# Patient Record
Sex: MALE | Race: OTHER | NOT HISPANIC OR LATINO | URBAN - METROPOLITAN AREA
[De-identification: names, ages, dates, MRNs, and addresses within clinical notes are randomized per-mention and may not be internally consistent; named-entity substitution may affect disease eponyms.]

---

## 2024-09-15 ENCOUNTER — EMERGENCY (EMERGENCY)
Facility: HOSPITAL | Age: 19
LOS: 0 days | Discharge: HOME | End: 2024-09-15
Attending: EMERGENCY MEDICINE
Payer: MEDICAID

## 2024-09-15 VITALS
RESPIRATION RATE: 18 BRPM | WEIGHT: 255.07 LBS | SYSTOLIC BLOOD PRESSURE: 158 MMHG | HEIGHT: 68 IN | OXYGEN SATURATION: 99 % | DIASTOLIC BLOOD PRESSURE: 88 MMHG | HEART RATE: 98 BPM | TEMPERATURE: 98 F

## 2024-09-15 DIAGNOSIS — R07.89 OTHER CHEST PAIN: ICD-10-CM

## 2024-09-15 DIAGNOSIS — R42 DIZZINESS AND GIDDINESS: ICD-10-CM

## 2024-09-15 DIAGNOSIS — I10 ESSENTIAL (PRIMARY) HYPERTENSION: ICD-10-CM

## 2024-09-15 PROCEDURE — 93308 TTE F-UP OR LMTD: CPT | Mod: 26

## 2024-09-15 PROCEDURE — 99284 EMERGENCY DEPT VISIT MOD MDM: CPT | Mod: 25

## 2024-09-15 PROCEDURE — 93010 ELECTROCARDIOGRAM REPORT: CPT

## 2024-09-15 PROCEDURE — 93308 TTE F-UP OR LMTD: CPT

## 2024-09-15 PROCEDURE — 93005 ELECTROCARDIOGRAM TRACING: CPT

## 2024-09-15 PROCEDURE — 99285 EMERGENCY DEPT VISIT HI MDM: CPT

## 2024-09-15 PROCEDURE — 71046 X-RAY EXAM CHEST 2 VIEWS: CPT

## 2024-09-15 PROCEDURE — 71046 X-RAY EXAM CHEST 2 VIEWS: CPT | Mod: 26

## 2024-09-15 RX ORDER — ONDANSETRON 2 MG/ML
4 INJECTION, SOLUTION INTRAMUSCULAR; INTRAVENOUS ONCE
Refills: 0 | Status: COMPLETED | OUTPATIENT
Start: 2024-09-15 | End: 2024-09-15

## 2024-09-15 RX ADMIN — ONDANSETRON 4 MILLIGRAM(S): 2 INJECTION, SOLUTION INTRAMUSCULAR; INTRAVENOUS at 18:40

## 2024-09-15 NOTE — ED PROVIDER NOTE - PATIENT PORTAL LINK FT
You can access the FollowMyHealth Patient Portal offered by [facility] by registering at the following website: [email or web address]. By joining [employer]’s FollowMyHealth portal, you will also be able to view your health information using other applications (apps) compatible with our system.

## 2024-09-15 NOTE — ED PROVIDER NOTE - CLINICAL SUMMARY MEDICAL DECISION MAKING FREE TEXT BOX
19-year-old male history of hypertension here for evaluation of left anterior throbbing chest pain since yesterday no PE risk factors fever chills viral-like symptoms.  Pain is not worse with movement.  CON: ao x 3, HENMT: neck supple,  CV: s1s2 ctab, RESP: cta b/l, GI:  soft, nontender, no rebound, no guarding, SKIN: no rash, MSK: no deformities, NEURO: no gross motor or sensory deficit Psychiatric: appropriate mood, appropriate affect   impression  Patient with low risk chest pain EKG normal sinus rhythm with sinus arrhythmia and lateral inferior ST T wave changes which are similar to previous.  Chest x-ray within normal notes POCUS echo normal outpatient follow-up advised.

## 2024-09-15 NOTE — ED PROVIDER NOTE - PROGRESS NOTE DETAILS
Damien Joseph, DO: ecg, cxr, and pocus echo reassuring. pt has cardiologist, advised to f/u. Patient is well appearing, NAD, afebrile, hemodynamically stable. Any available tests and studies were discussed with patient and/or family. Discharged with instructions in further symptomatic care, return precautions, and need for PMD f/u.

## 2024-09-15 NOTE — ED PROVIDER NOTE - EKG/XRAY ADDITIONAL INFORMATION
EKG normal sinus rhythm with sinus arrhythmia and lateral inferior ST T wave changes which are similar to previous.  Chest x-ray within normal notes POCUS echo normal outpatient follow-up advised.

## 2024-09-15 NOTE — ED ADULT NURSE NOTE - OBJECTIVE STATEMENT
C/o chest pain and dizziness since yesterday. [place] [facility] [place] ASA 324mg given by EMS.

## 2024-09-15 NOTE — ED PROVIDER NOTE - OBJECTIVE STATEMENT
19-year-old past medical history of hypertension, who presents for throbbing anterior left chest pain intermittent and waxing waning since yesterday.  Notably around 1 PM today, patient was sitting about to go to the gym when he started having worsening chest pain with some lightheadedness.  He went to urgent care and referred here for further evaluation.  Denies any recent illnesses, fevers, chills, shortness of breath, abdominal pain, nausea/vomiting, diaphoresis, radiation of pain, leg swelling.  Denies any known family history of early cardiac disease or sudden death of unclear etiology.  Denies any tobacco, alcohol, recreational drug use, stimulant use, testosterone use, recent travels or extended periods of immobilization.

## 2024-09-15 NOTE — ED PROVIDER NOTE - PHYSICAL EXAMINATION
Initial vital signs reviewed.  General: NAD, nontoxic appearing.  HENT: AT/NC.  Eyes: non-injected conjunctivae b/l.  Neck: supple.  CV: RRR, no murmurs. 2+ distal pulses x4.  Pulm: nonlabored work of breathing, CTAB, no chest wall tenderness or crepitus.  Abd: soft, nondistended, nontender.  MSK: no joint deformity.  Skin: warm, dry, well-perfused.  Neuro: A&Ox4.  Psych: appropriate mood and affect.

## 2024-09-15 NOTE — ED ADULT TRIAGE NOTE - CHIEF COMPLAINT QUOTE
C/o chest pain and dizziness since yesterday C/o chest pain and dizziness since yesterday. Sent in from [facility] [place] for abnormal [place]KG. ASA 324mg given by EMS.

## 2024-09-15 NOTE — ED PROVIDER NOTE - NSFOLLOWUPINSTRUCTIONS_ED_ALL_ED_FT
Please follow up with your cardiologist.    Chest Pain    You were seen and evaluated for chest pain. After a work up in the Emergency Department, it was determined that it is safe for you to be discharged with close follow up. Please monitor your symptoms and bring all of your results to follow up with your doctor. Please call for follow up with the heart doctor. As discussed, you may require further work up and testing for your chest pain.    WHAT YOU NEED TO KNOW:  Chest pain can be caused by a range of conditions, from not serious to life-threatening. Chest pain can be a symptom of a digestive problem, such as acid reflux or a stomach ulcer. An anxiety attack or a strong emotion, such as anger, can also cause chest pain. Infection, inflammation, or a fracture in the bones or cartilage in your chest can cause pain or discomfort. Sometimes chest pain or pressure is caused by poor blood flow to your heart (angina). Chest pain may also be caused by life-threatening conditions such as a heart attack or blood clot in your lungs.      DISCHARGE INSTRUCTIONS:  Call your local emergency number (911 in the [place]) or have someone call if:  You have any of the following signs of a heart attack:  Squeezing, pressure, or pain in your chest  You may also have any of the following:  Discomfort or pain in your back, neck, jaw, stomach, or arm  Shortness of breath  Nausea or vomiting  Lightheadedness or a sudden cold sweat  Return to the emergency department if:  You have chest discomfort that gets worse, even with medicine.  You cough or vomit blood.  Your bowel movements are black or bloody.  You cannot stop vomiting, or it hurts to swallow.  Call your doctor if:  You have questions or concerns about your condition or care.  Medicines:  Medicines may be given to treat the cause of your chest pain. Examples include pain medicine, anxiety medicine, or medicines to increase blood flow to your heart.  Do not take certain medicines without asking your healthcare provider first. These include NSAIDs, herbal or vitamin supplements, or hormones (estrogen or progestin).  Take your medicine as directed. Contact your healthcare provider if you think your medicine is not helping or if you have side effects. Tell him or her if you are allergic to any medicine. Keep a list of the medicines, vitamins, and herbs you take. Include the amounts, and when and why you take them. Bring the list or the pill bottles to follow-up visits. Carry your medicine list with you in case of an emergency.    Healthy living tips:  The following are general healthy guidelines. If the cause of your chest pain is known, your healthcare provider will give you specific guidelines to follow.    Do not smoke. Nicotine and other chemicals in cigarettes and cigars can cause lung and heart damage. Ask your healthcare provider for information if you currently smoke and need help to quit. E-cigarettes or smokeless tobacco still contain nicotine. Talk to your healthcare provider before you use these products.  Choose a variety of healthy foods as often as possible. Include fresh, frozen, or canned fruits and vegetables. Also include low-fat dairy products, fish, chicken (without skin), and lean meats. Your healthcare provider or a dietitian can help you create meal plans. You may need to avoid certain foods or drinks if your pain is caused by a digestion problem.  Healthy Foods  Lower your sodium (salt) intake. Limit foods that are high in sodium, such as canned foods, salty snacks, and cold cuts. If you add salt when you cook food, do not add more at the table. Choose low-sodium canned foods as much as possible.    Drink plenty of water every day. Water helps your body to control your temperature and blood pressure. Ask your healthcare provider how much water you should drink every day.  Ask about activity. Your healthcare provider will tell you which activities to limit or avoid. Ask when you can drive, return to work, and have sex. Ask about the best exercise plan for you.  Maintain a healthy weight. Ask your healthcare provider what a healthy weight is for you. Ask him or her to help you create a safe weight loss plan if you are overweight.    Follow up with your healthcare provider within 72 hours, or as directed:  You may need to return for more tests to find the cause of your chest pain. You may be referred to a specialist, such as a cardiologist or gastroenterologist. Write down your questions so you remember to ask them during your visits.

## 2024-09-15 NOTE — ED ADULT NURSE NOTE - CHIEF COMPLAINT QUOTE
C/o chest pain and dizziness since yesterday. [place] [facility] [place] ASA 324mg given by EMS.

## 2024-09-20 ENCOUNTER — APPOINTMENT (OUTPATIENT)
Dept: CARDIOLOGY | Facility: CLINIC | Age: 19
End: 2024-09-20

## 2024-09-20 PROBLEM — Z00.00 ENCOUNTER FOR PREVENTIVE HEALTH EXAMINATION: Status: ACTIVE | Noted: 2024-09-20

## 2024-10-12 ENCOUNTER — EMERGENCY (EMERGENCY)
Facility: HOSPITAL | Age: 19
LOS: 0 days | Discharge: HOME | End: 2024-10-12
Attending: EMERGENCY MEDICINE
Payer: MEDICAID

## 2024-10-12 ENCOUNTER — TRANSCRIPTION ENCOUNTER (OUTPATIENT)
Age: 19
End: 2024-10-12

## 2024-10-12 VITALS
HEART RATE: 77 BPM | SYSTOLIC BLOOD PRESSURE: 163 MMHG | TEMPERATURE: 98 F | DIASTOLIC BLOOD PRESSURE: 90 MMHG | OXYGEN SATURATION: 99 % | WEIGHT: 249.12 LBS | RESPIRATION RATE: 18 BRPM

## 2024-10-12 DIAGNOSIS — M79.671 PAIN IN RIGHT FOOT: ICD-10-CM
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
## 2024-10-12 DIAGNOSIS — I10 ESSENTIAL (PRIMARY) HYPERTENSION: ICD-10-CM

## 2024-10-12 DIAGNOSIS — S92.354A NONDISPLACED FRACTURE OF FIFTH METATARSAL BONE, RIGHT FOOT, INITIAL ENCOUNTER FOR CLOSED FRACTURE: ICD-10-CM
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
## 2024-10-12 PROCEDURE — 73610 X-RAY EXAM OF ANKLE: CPT | Mod: RT

## 2024-10-12 PROCEDURE — 99284 EMERGENCY DEPT VISIT MOD MDM: CPT

## 2024-10-12 PROCEDURE — 99284 EMERGENCY DEPT VISIT MOD MDM: CPT | Mod: 25

## 2024-10-12 PROCEDURE — 73610 X-RAY EXAM OF ANKLE: CPT | Mod: 26,RT

## 2024-10-12 PROCEDURE — 73630 X-RAY EXAM OF FOOT: CPT | Mod: 26,RT

## 2024-10-12 PROCEDURE — 73630 X-RAY EXAM OF FOOT: CPT | Mod: RT

## 2024-10-12 RX ORDER — ONDANSETRON HCL/PF 4 MG/2 ML
4 VIAL (ML) INJECTION ONCE
Refills: 0 | Status: COMPLETED | OUTPATIENT
Start: 2024-10-12 | End: 2024-10-12

## 2024-10-26 ENCOUNTER — EMERGENCY (EMERGENCY)
Facility: HOSPITAL | Age: 19
LOS: 0 days | Discharge: HOME | End: 2024-10-26
Attending: EMERGENCY MEDICINE
Payer: MEDICAID

## 2024-10-26 VITALS
TEMPERATURE: 100 F | OXYGEN SATURATION: 99 % | HEIGHT: 68 IN | SYSTOLIC BLOOD PRESSURE: 140 MMHG | RESPIRATION RATE: 19 BRPM | WEIGHT: 250 LBS | HEART RATE: 112 BPM | DIASTOLIC BLOOD PRESSURE: 92 MMHG

## 2024-10-26 VITALS
DIASTOLIC BLOOD PRESSURE: 73 MMHG | SYSTOLIC BLOOD PRESSURE: 139 MMHG | RESPIRATION RATE: 18 BRPM | HEART RATE: 90 BPM | TEMPERATURE: 98 F | OXYGEN SATURATION: 98 %

## 2024-10-26 DIAGNOSIS — I10 ESSENTIAL (PRIMARY) HYPERTENSION: ICD-10-CM

## 2024-10-26 DIAGNOSIS — Z91.148 PATIENT'S OTHER NONCOMPLIANCE WITH MEDICATION REGIMEN FOR OTHER REASON: ICD-10-CM

## 2024-10-26 DIAGNOSIS — R00.2 PALPITATIONS: ICD-10-CM

## 2024-10-26 DIAGNOSIS — R11.0 NAUSEA: ICD-10-CM

## 2024-10-26 PROCEDURE — 93010 ELECTROCARDIOGRAM REPORT: CPT

## 2024-10-26 PROCEDURE — 93005 ELECTROCARDIOGRAM TRACING: CPT

## 2024-10-26 PROCEDURE — 71046 X-RAY EXAM CHEST 2 VIEWS: CPT | Mod: 26

## 2024-10-26 PROCEDURE — 99283 EMERGENCY DEPT VISIT LOW MDM: CPT | Mod: 25

## 2024-10-26 PROCEDURE — 99284 EMERGENCY DEPT VISIT MOD MDM: CPT

## 2024-10-26 PROCEDURE — 71046 X-RAY EXAM CHEST 2 VIEWS: CPT

## 2024-10-26 RX ORDER — ONDANSETRON HCL/PF 4 MG/2 ML
4 VIAL (ML) INJECTION ONCE
Refills: 0 | Status: DISCONTINUED | OUTPATIENT
Start: 2024-10-26 | End: 2024-10-26

## 2024-10-26 NOTE — ED PROVIDER NOTE - PROGRESS NOTE DETAILS
CG: VS repeated, HR improved. CXR shows no cardiopulmonary pathology. Will DC patient home. Patient has established cardiology follow up, recommended to follow up with his cardiologist.

## 2024-10-26 NOTE — ED ADULT NURSE NOTE - CAS ELECT INFOMATION PROVIDED
writer read text from pt. to mother of Si and plan to go into traffic. Possible ingestion of pills last night DC instructions bipolar : mother and anxiety/depression RA and possible cardiac mother and mat. uncles recovered alcoholics MOTHER pt does not want admission

## 2024-10-26 NOTE — ED PROVIDER NOTE - PATIENT PORTAL LINK FT
You can access the FollowMyHealth Patient Portal offered by [facility] by registering at the following website: [email or web address]. By joining [employer]’s FollowMyHealth portal, you will also be able to view your health information using other applications (apps) compatible with our system.

## 2024-10-26 NOTE — ED ADULT NURSE NOTE - NSFALLUNIVINTERV_ED_ALL_ED
Bed/Stretcher in lowest position, wheels locked, appropriate side rails in place/Call bell, personal items and telephone in reach/Instruct patient to call for assistance before getting out of bed/chair/stretcher/Non-slip footwear applied when patient is off stretcher/[place] to call system/Physically safe environment - no spills, clutter or unnecessary equipment/Purposeful proactive rounding/Room/bathroom lighting operational, light cord in reach

## 2024-10-26 NOTE — ED PROVIDER NOTE - CLINICAL SUMMARY MEDICAL DECISION MAKING FREE TEXT BOX
19-year-old male, PMH of HTN, not compliant with meds, presents to the ED for evaluation of palpitations.  Patient states his symptoms woke him up from sleep. Pt states he was fasting the whole day, ate cheeseburgers and fries very quickly and soon after his symptoms started. Denies any recent illness.  Reports nausea and lightheadedness at symptom onset.  States lightheadedness has since resolved.  Denies any history of sudden cardiac death in the family.  Of note patient had similar symptoms about a month ago, was evaluated in the ED, had normal bedside echo and followed up with cardiologist. Patient denies chest pain, shortness of breath, diaphoresis, syncope/near syncope, abdominal pain, back pain, numbness/tingling in the extremities, urinary symptoms, or diarrhea/constipation.  Patient denies any alcohol or substance use.  Denies caffeine use. On exam, pt in NAD, AAOx3, head NC/AT, CN II-XII intact, PEERL, EOMi, neck (-) midline tenderness, lungs CTA B/L, CV S1S2 regular, abdomen soft/NT/ND/(+)BS, ext (-) edema, motor 5/5x4, sensation intact, ambulating with steady gait. XR/EKG reviewed. Pt is asymptomatic now. Will d/c.

## 2024-10-26 NOTE — ED PROVIDER NOTE - OBJECTIVE STATEMENT
19-year-old male PMH HTN no longer on medication presents to the ED for evaluation of palpitations.  Patient states his symptoms woke him from sleep.  Reports chest 2 hours prior to onset of symptoms he had a cheeseburger and fries after fasting all day.  Denies any recent illnesses.  Reports nausea and lightheadedness at symptom onset.  States lightheadedness has since resolved.  Denies any history of sudden cardiac death in the family.  Of note patient had similar symptoms about a month ago, was evaluated in the ED, had normal bedside echo and followed up with cardiologist states he was told by his cardiologist everything was normal.  Patient denies chest pain, shortness of breath, diaphoresis, syncope/near syncope, abdominal pain, back pain, numbness/tingling in the extremities, urinary symptoms, or diarrhea/constipation.  Patient denies any alcohol or substance use.  Denies caffeine use.

## 2024-10-26 NOTE — ED PROVIDER NOTE - NSFOLLOWUPINSTRUCTIONS_ED_ALL_ED_FT
Follow up with your cardiologist.     Palpitations    A palpitation is the feeling that your heartbeat is irregular or is faster than normal. It may feel like your heart is fluttering or skipping a beat. They may be caused by many things, including smoking, caffeine, alcohol, stress, and certain medicines. Although most causes of palpitations are not serious, palpitations can be a sign of a serious medical problem. Avoid caffeine, alcohol, and tobacco products at home. Try to reduce stress and anxiety and make sure to get plenty of rest.     SEEK IMMEDIATE MEDICAL CARE IF YOU HAVE ANY OF THE FOLLOWING SYMPTOMS: chest pain, shortness of breath, severe headache, dizziness/lightheadedness, or fainting.

## 2024-10-26 NOTE — ED PROVIDER NOTE - PHYSICAL EXAMINATION
VITAL SIGNS: I have reviewed nursing notes and confirm.  CONSTITUTIONAL: well-appearing, non-toxic, NAD  SKIN: Warm dry  HEAD: NCAT  EYES: EOMI, PERRL  ENT: Moist mucous membranes, normal pharynx with no erythema or exudates  NECK: Supple  CARD: RRR  RESP: clear to ausculation b/l.  No rales, rhonchi, or wheezing.  ABD: soft, non-tender, non-distended  EXT: Full ROM, no pedal edema, no calf tenderness  NEURO: Grossly intact.  PSYCH: Cooperative, appropriate.